# Patient Record
Sex: FEMALE | URBAN - METROPOLITAN AREA
[De-identification: names, ages, dates, MRNs, and addresses within clinical notes are randomized per-mention and may not be internally consistent; named-entity substitution may affect disease eponyms.]

---

## 2021-06-04 ENCOUNTER — OFFICE VISIT (OUTPATIENT)
Dept: URBAN - METROPOLITAN AREA CLINIC 32 | Facility: CLINIC | Age: 41
End: 2021-06-04
Payer: COMMERCIAL

## 2021-06-04 DIAGNOSIS — H52.03 HYPERMETROPIA, BILATERAL: Primary | ICD-10-CM

## 2021-06-04 DIAGNOSIS — H04.123 DRY EYE SYNDROME: ICD-10-CM

## 2021-06-04 PROCEDURE — 92004 COMPRE OPH EXAM NEW PT 1/>: CPT | Performed by: OPTOMETRIST

## 2021-06-04 ASSESSMENT — VISUAL ACUITY
OS: 20/20
OD: 20/20

## 2021-06-04 ASSESSMENT — KERATOMETRY
OD: 44.88
OS: 44.88

## 2021-06-04 ASSESSMENT — INTRAOCULAR PRESSURE
OD: 13
OS: 15

## 2021-06-04 NOTE — IMPRESSION/PLAN
Impression: Dry Eye Syndrome: M85.971. Plan: Dry eyes account for the patient's complaints. There is no evidence of permanent changes to the cornea. Explained condition does not have a cure and will need artificial tears for maintenance.